# Patient Record
Sex: FEMALE | Race: WHITE | NOT HISPANIC OR LATINO | Employment: FULL TIME
[De-identification: names, ages, dates, MRNs, and addresses within clinical notes are randomized per-mention and may not be internally consistent; named-entity substitution may affect disease eponyms.]

---

## 2018-06-30 ENCOUNTER — HEALTH MAINTENANCE LETTER (OUTPATIENT)
Age: 28
End: 2018-06-30

## 2019-09-30 ENCOUNTER — HEALTH MAINTENANCE LETTER (OUTPATIENT)
Age: 29
End: 2019-09-30

## 2021-01-15 ENCOUNTER — HEALTH MAINTENANCE LETTER (OUTPATIENT)
Age: 31
End: 2021-01-15

## 2021-10-24 ENCOUNTER — HEALTH MAINTENANCE LETTER (OUTPATIENT)
Age: 31
End: 2021-10-24

## 2022-02-13 ENCOUNTER — HEALTH MAINTENANCE LETTER (OUTPATIENT)
Age: 32
End: 2022-02-13

## 2022-10-15 ENCOUNTER — HEALTH MAINTENANCE LETTER (OUTPATIENT)
Age: 32
End: 2022-10-15

## 2023-03-26 ENCOUNTER — HEALTH MAINTENANCE LETTER (OUTPATIENT)
Age: 33
End: 2023-03-26

## 2023-09-18 LAB
HEPATITIS B SURFACE ANTIGEN (EXTERNAL): NEGATIVE
HIV1+2 AB SERPL QL IA: NEGATIVE
RUBELLA ANTIBODY IGG (EXTERNAL): NORMAL
VDRL (SYPHILIS) (EXTERNAL): NONREACTIVE

## 2024-01-02 ENCOUNTER — HOSPITAL ENCOUNTER (OUTPATIENT)
Facility: CLINIC | Age: 34
Discharge: HOME | End: 2024-01-02
Attending: OBSTETRICS & GYNECOLOGY | Admitting: OBSTETRICS & GYNECOLOGY
Payer: COMMERCIAL

## 2024-01-02 ENCOUNTER — HOSPITAL ENCOUNTER (OUTPATIENT)
Facility: CLINIC | Age: 34
End: 2024-01-02
Admitting: OBSTETRICS & GYNECOLOGY

## 2024-01-02 VITALS
HEIGHT: 65 IN | TEMPERATURE: 98.2 F | SYSTOLIC BLOOD PRESSURE: 117 MMHG | BODY MASS INDEX: 29.66 KG/M2 | RESPIRATION RATE: 16 BRPM | WEIGHT: 178 LBS | HEART RATE: 103 BPM | DIASTOLIC BLOOD PRESSURE: 74 MMHG

## 2024-01-02 PROCEDURE — 250N000013 HC RX MED GY IP 250 OP 250 PS 637: Performed by: OBSTETRICS & GYNECOLOGY

## 2024-01-02 PROCEDURE — G0463 HOSPITAL OUTPT CLINIC VISIT: HCPCS

## 2024-01-02 RX ORDER — ACETAMINOPHEN 325 MG/1
650 TABLET ORAL EVERY 6 HOURS PRN
Status: ON HOLD | COMMUNITY
End: 2024-04-23

## 2024-01-02 RX ADMIN — CYCLOBENZAPRINE HYDROCHLORIDE 7.5 MG: 5 TABLET, FILM COATED ORAL at 11:05

## 2024-01-02 ASSESSMENT — ACTIVITIES OF DAILY LIVING (ADL): ADLS_ACUITY_SCORE: 22

## 2024-01-02 NOTE — DISCHARGE INSTRUCTIONS
Discharge Instruction for Undelivered Patients      You were seen for:  Back Pain  We Consulted: Dr. Green  You had (Test or Medicine):uterine and fetal monitoring, Flexeril     Diet:   Drink 8 to 12 glasses of liquids (milk, juice, water) every day.  You may eat meals and snacks.     Activity:  Call your doctor or nurse midwife if your baby is moving less than usual.   Rest and take it easy for the next couple days.    Call your provider if you notice:  Swelling in your face or increased swelling in your hands or legs.  Headaches that are not relieved by Tylenol (acetaminophen).  Changes in your vision (blurring: seeing spots or stars.)  Nausea (sick to your stomach) and vomiting (throwing up).   Weight gain of 5 pounds or more per week.  Heartburn that doesn't go away.  Signs of bladder infection: pain when you urinate (use the toilet), need to go more often and more urgently.  The bag of bains (rupture of membranes) breaks, or you notice leaking in your underwear.  Bright red blood in your underwear.  Abdominal (lower belly) or stomach pain.  *If less than 34 weeks: Contractions (tightening) more than 6 times in one hour.  Increase or change in vaginal discharge (note the color and amount)    Follow-up:  Make and appointment to be seen in the next 2-3 days with provider and possible PT referral

## 2024-01-02 NOTE — PROVIDER NOTIFICATION
01/02/24 1032   Provider Notification   Provider Name/Title    Method of Notification Phone   Request Evaluate - Remote   Notification Reason Patient Arrived;Pain;Other (Comment)     Dr. Green paged and updated on patient arrival, patient c/o left sided low back pain near her tailbone that has been happening for past several weeks and has gotten significantly worse over the past 2-3 days. Patient unable to ambulate independently and has had to crawl on hands and knees to move around. Patient reporting any pressure on left leg causes sharp pain. FHR tracing appropriate  for gestational age with baseline 145bpm, no contractions per toco or palpation. Orders received for a one time dose of flexeril, see how patient responds, if improves patient may discharge to home with follow up to be scheduled in clinic and for PT referral to be placed. If no improvement, will plan to update provider for further orders.

## 2024-01-02 NOTE — PLAN OF CARE
Data: Patient presented to [facility]: 2024  9:50 AM.  Reason for maternal/fetal assessment is low back pain. Patient reports she has been having left sided back pain near her tailbone for the last several weeks, but the pain has gotten significantly worse over the past 2-3 days. Patient states she has been unable to get out of bed independently and has required assistance to the bathroom or has had to crawl on hands and knees to get around. Patient reports any pressure on left leg causes sharp pain in near her tailbone only on the left side. Patient observed to be uncomfortable with movement and wincing with coughing. Patient has tried ice, heat, lidocaine patch and tylenol and has little to no relief  Patient is a .  Prenatal record reviewed. Pregnancy has been uncomplicated..  Gestational Age 24w3d. VSS. Fetal movement active. Patient denies uterine contractions, leaking of vaginal fluid/rupture of membranes, vaginal bleeding, abdominal pain, pelvic pressure, nausea, vomiting, headache, visual disturbances, epigastric or URQ pain, significant edema. Support person is present.   Action: Verbal consent for EFM. Triage assessment completed. Bill of rights reviewed.  Response: Patient verbalized agreement with plan. Will contact Dr Marisol Green with update and for further orders.

## 2024-01-02 NOTE — PLAN OF CARE
Data: Patient assessed in the [facility] for lower left sided back pain near her tailbone.  Cervical exam not examined.  Membranes intact.  Contractions/uterine assessment none per toco, palpation, or patient report. Back pain slightly improved after Flexeril administration, patient now able to get out of bed and walk short distance independently.  Action:  Presumed adequate fetal oxygenation documented (see flow record). Discharge instructions reviewed.  Patient instructed to report change in fetal movement, vaginal leaking of fluid or bleeding, abdominal pain, or any concerns related to the pregnancy to her nurse/physician.    Response: Orders to discharge home per Marisol Green.  Patient verbalized understanding of education and verbalized agreement with plan. Discharged to home at 1202.

## 2024-02-19 ENCOUNTER — HOSPITAL ENCOUNTER (OUTPATIENT)
Facility: CLINIC | Age: 34
Discharge: HOME | End: 2024-02-19
Attending: STUDENT IN AN ORGANIZED HEALTH CARE EDUCATION/TRAINING PROGRAM | Admitting: STUDENT IN AN ORGANIZED HEALTH CARE EDUCATION/TRAINING PROGRAM
Payer: COMMERCIAL

## 2024-02-19 VITALS — SYSTOLIC BLOOD PRESSURE: 120 MMHG | DIASTOLIC BLOOD PRESSURE: 75 MMHG

## 2024-02-19 PROBLEM — Z36.89 ENCOUNTER FOR TRIAGE IN PREGNANT PATIENT: Status: ACTIVE | Noted: 2024-02-19

## 2024-02-19 PROCEDURE — 59025 FETAL NON-STRESS TEST: CPT

## 2024-02-19 PROCEDURE — G0463 HOSPITAL OUTPT CLINIC VISIT: HCPCS | Mod: 25

## 2024-02-19 RX ORDER — LIDOCAINE 40 MG/G
CREAM TOPICAL
Status: DISCONTINUED | OUTPATIENT
Start: 2024-02-19 | End: 2024-02-19 | Stop reason: HOSPADM

## 2024-02-19 ASSESSMENT — ACTIVITIES OF DAILY LIVING (ADL): ADLS_ACUITY_SCORE: 35

## 2024-02-19 NOTE — PROVIDER NOTIFICATION
02/19/24 1040   Provider Notification   Provider Name/Title Dr. Funez   Method of Notification In Department     Dr Michelle Barrios informed of patient arrival and assessment including the following:    Reason for maternal/fetal assessment decreased fetal movement. Pt reports not feeling baby move as much in the last couple days and no movement today. Fetal status normal baseline, moderate variability, accelerations present and no decelerations. Plan per provider/orders received for discharge home after a reactive NST.

## 2024-02-19 NOTE — DISCHARGE INSTRUCTIONS
Learning About When to Call Your Doctor During Pregnancy (After 20 Weeks)  Overview  It's common to have concerns about what might be a problem when you're pregnant. Most pregnancies don't have any serious problems. But it's still important to know when to call your doctor if you have certain symptoms or signs of labor.  These are general suggestions. Your doctor may give you some more information about when to call.  When to call your doctor (after 20 weeks)  Call 911  anytime you think you may need emergency care. For example, call if:  You have severe vaginal bleeding. This means you are soaking through a pad each hour for 2 or more hours.  You have sudden, severe pain in your belly.  You have chest pain, are short of breath, or cough up blood.  You passed out (lost consciousness).  You have a seizure.  You see or feel the umbilical cord.  You think you are about to deliver your baby and can't make it safely to the hospital or birthing center.  Call your doctor now or seek immediate medical care if:  You have vaginal bleeding.  You have belly pain.  You have a fever.  You are dizzy or lightheaded, or you feel like you may faint.  You have signs of a blood clot in your leg (called a deep vein thrombosis), such as:  Pain in the calf, back of the knee, thigh, or groin.  Swelling in your leg or groin.  A color change on the leg or groin. The skin may be reddish or purplish, depending on your usual skin color.  You have symptoms of preeclampsia, such as:  Sudden swelling of your face, hands, or feet.  New vision problems (such as dimness, blurring, or seeing spots).  A severe headache.  You have a sudden release of fluid from your vagina. (You think your water broke.)  You've been having regular contractions for an hour. This means that you've had at least 6 contractions within 1 hour, even after you change your position and drink fluids.  You notice that your baby has stopped moving or is moving less than  "normal.  You have signs of heart failure, such as:  New or increased shortness of breath.  New or worse swelling in your legs, ankles, or feet.  Sudden weight gain, such as more than 2 to 3 pounds in a day or 5 pounds in a week.  Feeling so tired or weak that you cannot do your usual activities.  You have symptoms of a urinary tract infection. These may include:  Pain or burning when you urinate.  A frequent need to urinate without being able to pass much urine.  Pain in the flank, which is just below the rib cage and above the waist on either side of the back.  Blood in your urine.  Watch closely for changes in your health, and be sure to contact your doctor if:  You have vaginal discharge that smells bad.  You feel sad, anxious, or hopeless for more than a few days.  You have skin changes, such as a rash, itching, or a yellow color to your skin.  You have other concerns about your pregnancy.  If you have labor signs at 37 weeks or more  If you have signs of labor at 37 weeks or more, your doctor may tell you to call when your labor becomes more active. Symptoms of active labor include:  Contractions that are regular.  Contractions that are less than 5 minutes apart.  Contractions that are hard to talk through.  Follow-up care is a key part of your treatment and safety. Be sure to make and go to all appointments, and call your doctor if you are having problems. It's also a good idea to know your test results and keep a list of the medicines you take.  Where can you learn more?  Go to https://www.[employer].net/patiented  Enter N531 in the search box to learn more about \"Learning About When to Call Your Doctor During Pregnancy (After 20 Weeks).\"  Current as of: July 11, 2023               Content Version: 13.8    9409-7366 [employer].   Care instructions adapted under license by your healthcare professional. If you have questions about a medical condition or this instruction, always ask your healthcare " professional. [employer] disclaims any warranty or liability for your use of this information.

## 2024-02-19 NOTE — PROVIDER NOTIFICATION
02/19/24 1110   Provider Notification   Provider Name/Title Dr. Ospina   Method of Notification Phone     MD reviewed fetal tracing and occasional contractions. Patient reports lower abdominal tightening which also feels like baby moving. Okay to discharge home

## 2024-03-25 LAB — GROUP B STREPTOCOCCUS (EXTERNAL): NEGATIVE

## 2024-04-20 ENCOUNTER — MEDICAL CORRESPONDENCE (OUTPATIENT)
Dept: HEALTH INFORMATION MANAGEMENT | Facility: CLINIC | Age: 34
End: 2024-04-20
Payer: COMMERCIAL

## 2024-04-21 ENCOUNTER — HOSPITAL ENCOUNTER (INPATIENT)
Facility: CLINIC | Age: 34
LOS: 2 days | Discharge: HOME HEALTH | End: 2024-04-23
Attending: OBSTETRICS & GYNECOLOGY | Admitting: OBSTETRICS & GYNECOLOGY
Payer: COMMERCIAL

## 2024-04-21 ENCOUNTER — ANESTHESIA EVENT (OUTPATIENT)
Dept: OBGYN | Facility: CLINIC | Age: 34
End: 2024-04-21

## 2024-04-21 ENCOUNTER — ANESTHESIA (OUTPATIENT)
Dept: OBGYN | Facility: CLINIC | Age: 34
End: 2024-04-21
Payer: COMMERCIAL

## 2024-04-21 LAB
ABO/RH(D): NORMAL
ALBUMIN SERPL BCG-MCNC: 3.3 G/DL (ref 3.5–5.2)
ALP SERPL-CCNC: 209 U/L (ref 40–150)
ALT SERPL W P-5'-P-CCNC: 18 U/L (ref 0–50)
ANION GAP SERPL CALCULATED.3IONS-SCNC: 9 MMOL/L (ref 7–15)
ANTIBODY SCREEN: NEGATIVE
AST SERPL W P-5'-P-CCNC: 19 U/L (ref 0–45)
BILIRUB SERPL-MCNC: 0.2 MG/DL
BUN SERPL-MCNC: 7.6 MG/DL (ref 6–20)
CALCIUM SERPL-MCNC: 9 MG/DL (ref 8.6–10)
CHLORIDE SERPL-SCNC: 104 MMOL/L (ref 98–107)
CREAT SERPL-MCNC: 0.64 MG/DL (ref 0.51–0.95)
CRYSTALS AMN MICRO: NORMAL
DEPRECATED HCO3 PLAS-SCNC: 25 MMOL/L (ref 22–29)
EGFRCR SERPLBLD CKD-EPI 2021: >90 ML/MIN/1.73M2
ERYTHROCYTE [DISTWIDTH] IN BLOOD BY AUTOMATED COUNT: 13.2 % (ref 10–15)
GLUCOSE SERPL-MCNC: 115 MG/DL (ref 70–99)
HCT VFR BLD AUTO: 37.3 % (ref 35–47)
HGB BLD-MCNC: 12 G/DL (ref 11.7–15.7)
HGB BLD-MCNC: 12.1 G/DL (ref 11.7–15.7)
MCH RBC QN AUTO: 28.4 PG (ref 26.5–33)
MCHC RBC AUTO-ENTMCNC: 32.4 G/DL (ref 31.5–36.5)
MCV RBC AUTO: 88 FL (ref 78–100)
PLATELET # BLD AUTO: 186 10E3/UL (ref 150–450)
POTASSIUM SERPL-SCNC: 3.8 MMOL/L (ref 3.4–5.3)
PROT SERPL-MCNC: 6 G/DL (ref 6.4–8.3)
RBC # BLD AUTO: 4.26 10E6/UL (ref 3.8–5.2)
RUPTURE OF FETAL MEMBRANES BY ROM PLUS: POSITIVE
SODIUM SERPL-SCNC: 138 MMOL/L (ref 135–145)
SPECIMEN EXPIRATION DATE: NORMAL
T PALLIDUM AB SER QL: NONREACTIVE
WBC # BLD AUTO: 8.5 10E3/UL (ref 4–11)

## 2024-04-21 PROCEDURE — 250N000011 HC RX IP 250 OP 636: Performed by: ANESTHESIOLOGY

## 2024-04-21 PROCEDURE — 84112 EVAL AMNIOTIC FLUID PROTEIN: CPT | Performed by: OBSTETRICS & GYNECOLOGY

## 2024-04-21 PROCEDURE — 258N000003 HC RX IP 258 OP 636: Performed by: OBSTETRICS & GYNECOLOGY

## 2024-04-21 PROCEDURE — 250N000009 HC RX 250: Performed by: OBSTETRICS & GYNECOLOGY

## 2024-04-21 PROCEDURE — 370N000003 HC ANESTHESIA WARD SERVICE: Performed by: ANESTHESIOLOGY

## 2024-04-21 PROCEDURE — 3E0R3BZ INTRODUCTION OF ANESTHETIC AGENT INTO SPINAL CANAL, PERCUTANEOUS APPROACH: ICD-10-PCS | Performed by: ANESTHESIOLOGY

## 2024-04-21 PROCEDURE — 80053 COMPREHEN METABOLIC PANEL: CPT | Performed by: OBSTETRICS & GYNECOLOGY

## 2024-04-21 PROCEDURE — 86780 TREPONEMA PALLIDUM: CPT | Performed by: OBSTETRICS & GYNECOLOGY

## 2024-04-21 PROCEDURE — 120N000001 HC R&B MED SURG/OB

## 2024-04-21 PROCEDURE — 36415 COLL VENOUS BLD VENIPUNCTURE: CPT | Performed by: OBSTETRICS & GYNECOLOGY

## 2024-04-21 PROCEDURE — 00HU33Z INSERTION OF INFUSION DEVICE INTO SPINAL CANAL, PERCUTANEOUS APPROACH: ICD-10-PCS | Performed by: ANESTHESIOLOGY

## 2024-04-21 PROCEDURE — 0KQM0ZZ REPAIR PERINEUM MUSCLE, OPEN APPROACH: ICD-10-PCS | Performed by: OBSTETRICS & GYNECOLOGY

## 2024-04-21 PROCEDURE — 85018 HEMOGLOBIN: CPT | Performed by: OBSTETRICS & GYNECOLOGY

## 2024-04-21 PROCEDURE — 86900 BLOOD TYPING SEROLOGIC ABO: CPT | Performed by: OBSTETRICS & GYNECOLOGY

## 2024-04-21 PROCEDURE — 250N000013 HC RX MED GY IP 250 OP 250 PS 637: Performed by: OBSTETRICS & GYNECOLOGY

## 2024-04-21 PROCEDURE — 722N000001 HC LABOR CARE VAGINAL DELIVERY SINGLE

## 2024-04-21 PROCEDURE — 85018 HEMOGLOBIN: CPT | Performed by: ANESTHESIOLOGY

## 2024-04-21 PROCEDURE — G0463 HOSPITAL OUTPT CLINIC VISIT: HCPCS

## 2024-04-21 PROCEDURE — 250N000011 HC RX IP 250 OP 636: Mod: JZ | Performed by: OBSTETRICS & GYNECOLOGY

## 2024-04-21 PROCEDURE — 0UQKXZZ REPAIR HYMEN, EXTERNAL APPROACH: ICD-10-PCS | Performed by: OBSTETRICS & GYNECOLOGY

## 2024-04-21 RX ORDER — KETOROLAC TROMETHAMINE 30 MG/ML
30 INJECTION, SOLUTION INTRAMUSCULAR; INTRAVENOUS
Status: DISCONTINUED | OUTPATIENT
Start: 2024-04-21 | End: 2024-04-23

## 2024-04-21 RX ORDER — LOPERAMIDE HCL 2 MG
2 CAPSULE ORAL
Status: DISCONTINUED | OUTPATIENT
Start: 2024-04-21 | End: 2024-04-23 | Stop reason: HOSPADM

## 2024-04-21 RX ORDER — FENTANYL CITRATE 50 UG/ML
100 INJECTION, SOLUTION INTRAMUSCULAR; INTRAVENOUS
Status: DISCONTINUED | OUTPATIENT
Start: 2024-04-21 | End: 2024-04-21 | Stop reason: HOSPADM

## 2024-04-21 RX ORDER — TERBUTALINE SULFATE 1 MG/ML
0.25 INJECTION, SOLUTION SUBCUTANEOUS
Status: DISCONTINUED | OUTPATIENT
Start: 2024-04-21 | End: 2024-04-21 | Stop reason: HOSPADM

## 2024-04-21 RX ORDER — MODIFIED LANOLIN
OINTMENT (GRAM) TOPICAL
Status: DISCONTINUED | OUTPATIENT
Start: 2024-04-21 | End: 2024-04-23 | Stop reason: HOSPADM

## 2024-04-21 RX ORDER — NALBUPHINE HYDROCHLORIDE 20 MG/ML
2.5-5 INJECTION, SOLUTION INTRAMUSCULAR; INTRAVENOUS; SUBCUTANEOUS EVERY 6 HOURS PRN
Status: DISCONTINUED | OUTPATIENT
Start: 2024-04-21 | End: 2024-04-23 | Stop reason: HOSPADM

## 2024-04-21 RX ORDER — MISOPROSTOL 200 UG/1
400 TABLET ORAL
Status: DISCONTINUED | OUTPATIENT
Start: 2024-04-21 | End: 2024-04-21 | Stop reason: HOSPADM

## 2024-04-21 RX ORDER — OXYTOCIN/0.9 % SODIUM CHLORIDE 30/500 ML
340 PLASTIC BAG, INJECTION (ML) INTRAVENOUS CONTINUOUS PRN
Status: DISCONTINUED | OUTPATIENT
Start: 2024-04-21 | End: 2024-04-23 | Stop reason: HOSPADM

## 2024-04-21 RX ORDER — NALOXONE HYDROCHLORIDE 0.4 MG/ML
0.2 INJECTION, SOLUTION INTRAMUSCULAR; INTRAVENOUS; SUBCUTANEOUS
Status: DISCONTINUED | OUTPATIENT
Start: 2024-04-21 | End: 2024-04-21 | Stop reason: HOSPADM

## 2024-04-21 RX ORDER — DOCUSATE SODIUM 100 MG/1
100 CAPSULE, LIQUID FILLED ORAL DAILY
Status: DISCONTINUED | OUTPATIENT
Start: 2024-04-21 | End: 2024-04-23 | Stop reason: HOSPADM

## 2024-04-21 RX ORDER — ACETAMINOPHEN 325 MG/1
650 TABLET ORAL EVERY 4 HOURS PRN
Status: DISCONTINUED | OUTPATIENT
Start: 2024-04-21 | End: 2024-04-21 | Stop reason: HOSPADM

## 2024-04-21 RX ORDER — METHYLERGONOVINE MALEATE 0.2 MG/ML
200 INJECTION INTRAVENOUS
Status: DISCONTINUED | OUTPATIENT
Start: 2024-04-21 | End: 2024-04-23 | Stop reason: HOSPADM

## 2024-04-21 RX ORDER — HYDROXYZINE HYDROCHLORIDE 50 MG/1
50 TABLET, FILM COATED ORAL EVERY 6 HOURS PRN
Status: DISCONTINUED | OUTPATIENT
Start: 2024-04-21 | End: 2024-04-23 | Stop reason: HOSPADM

## 2024-04-21 RX ORDER — OXYTOCIN/0.9 % SODIUM CHLORIDE 30/500 ML
100-340 PLASTIC BAG, INJECTION (ML) INTRAVENOUS CONTINUOUS PRN
Status: DISCONTINUED | OUTPATIENT
Start: 2024-04-21 | End: 2024-04-23 | Stop reason: HOSPADM

## 2024-04-21 RX ORDER — MISOPROSTOL 200 UG/1
800 TABLET ORAL
Status: DISCONTINUED | OUTPATIENT
Start: 2024-04-21 | End: 2024-04-21 | Stop reason: HOSPADM

## 2024-04-21 RX ORDER — SODIUM CHLORIDE, SODIUM LACTATE, POTASSIUM CHLORIDE, CALCIUM CHLORIDE 600; 310; 30; 20 MG/100ML; MG/100ML; MG/100ML; MG/100ML
10-125 INJECTION, SOLUTION INTRAVENOUS CONTINUOUS
Status: DISCONTINUED | OUTPATIENT
Start: 2024-04-21 | End: 2024-04-23 | Stop reason: HOSPADM

## 2024-04-21 RX ORDER — HYDROXYZINE HYDROCHLORIDE 25 MG/1
25 TABLET, FILM COATED ORAL EVERY 6 HOURS PRN
Status: DISCONTINUED | OUTPATIENT
Start: 2024-04-21 | End: 2024-04-23 | Stop reason: HOSPADM

## 2024-04-21 RX ORDER — LIDOCAINE 40 MG/G
CREAM TOPICAL
Status: DISCONTINUED | OUTPATIENT
Start: 2024-04-21 | End: 2024-04-23 | Stop reason: HOSPADM

## 2024-04-21 RX ORDER — NALOXONE HYDROCHLORIDE 0.4 MG/ML
0.4 INJECTION, SOLUTION INTRAMUSCULAR; INTRAVENOUS; SUBCUTANEOUS
Status: DISCONTINUED | OUTPATIENT
Start: 2024-04-21 | End: 2024-04-23 | Stop reason: HOSPADM

## 2024-04-21 RX ORDER — MISOPROSTOL 200 UG/1
400 TABLET ORAL
Status: DISCONTINUED | OUTPATIENT
Start: 2024-04-21 | End: 2024-04-23 | Stop reason: HOSPADM

## 2024-04-21 RX ORDER — OXYTOCIN 10 [USP'U]/ML
10 INJECTION, SOLUTION INTRAMUSCULAR; INTRAVENOUS
Status: DISCONTINUED | OUTPATIENT
Start: 2024-04-21 | End: 2024-04-21 | Stop reason: HOSPADM

## 2024-04-21 RX ORDER — NALOXONE HYDROCHLORIDE 0.4 MG/ML
0.4 INJECTION, SOLUTION INTRAMUSCULAR; INTRAVENOUS; SUBCUTANEOUS
Status: DISCONTINUED | OUTPATIENT
Start: 2024-04-21 | End: 2024-04-21 | Stop reason: HOSPADM

## 2024-04-21 RX ORDER — ACETAMINOPHEN 325 MG/1
650 TABLET ORAL EVERY 4 HOURS PRN
Status: DISCONTINUED | OUTPATIENT
Start: 2024-04-21 | End: 2024-04-23 | Stop reason: HOSPADM

## 2024-04-21 RX ORDER — PROCHLORPERAZINE 25 MG
25 SUPPOSITORY, RECTAL RECTAL EVERY 12 HOURS PRN
Status: DISCONTINUED | OUTPATIENT
Start: 2024-04-21 | End: 2024-04-21 | Stop reason: HOSPADM

## 2024-04-21 RX ORDER — NALOXONE HYDROCHLORIDE 0.4 MG/ML
0.2 INJECTION, SOLUTION INTRAMUSCULAR; INTRAVENOUS; SUBCUTANEOUS
Status: DISCONTINUED | OUTPATIENT
Start: 2024-04-21 | End: 2024-04-23 | Stop reason: HOSPADM

## 2024-04-21 RX ORDER — CARBOPROST TROMETHAMINE 250 UG/ML
250 INJECTION, SOLUTION INTRAMUSCULAR
Status: DISCONTINUED | OUTPATIENT
Start: 2024-04-21 | End: 2024-04-23 | Stop reason: HOSPADM

## 2024-04-21 RX ORDER — HYDROCORTISONE 25 MG/G
CREAM TOPICAL 3 TIMES DAILY PRN
Status: DISCONTINUED | OUTPATIENT
Start: 2024-04-21 | End: 2024-04-23 | Stop reason: HOSPADM

## 2024-04-21 RX ORDER — IBUPROFEN 800 MG/1
800 TABLET, FILM COATED ORAL
Status: DISCONTINUED | OUTPATIENT
Start: 2024-04-21 | End: 2024-04-23

## 2024-04-21 RX ORDER — OXYCODONE HYDROCHLORIDE 5 MG/1
5 TABLET ORAL EVERY 4 HOURS PRN
Status: DISCONTINUED | OUTPATIENT
Start: 2024-04-21 | End: 2024-04-23 | Stop reason: HOSPADM

## 2024-04-21 RX ORDER — PROCHLORPERAZINE MALEATE 10 MG
10 TABLET ORAL EVERY 6 HOURS PRN
Status: DISCONTINUED | OUTPATIENT
Start: 2024-04-21 | End: 2024-04-21 | Stop reason: HOSPADM

## 2024-04-21 RX ORDER — LOPERAMIDE HCL 2 MG
2 CAPSULE ORAL
Status: DISCONTINUED | OUTPATIENT
Start: 2024-04-21 | End: 2024-04-21 | Stop reason: HOSPADM

## 2024-04-21 RX ORDER — SODIUM CHLORIDE, SODIUM LACTATE, POTASSIUM CHLORIDE, CALCIUM CHLORIDE 600; 310; 30; 20 MG/100ML; MG/100ML; MG/100ML; MG/100ML
INJECTION, SOLUTION INTRAVENOUS CONTINUOUS
Status: DISCONTINUED | OUTPATIENT
Start: 2024-04-21 | End: 2024-04-21 | Stop reason: HOSPADM

## 2024-04-21 RX ORDER — METHYLERGONOVINE MALEATE 0.2 MG/ML
200 INJECTION INTRAVENOUS
Status: DISCONTINUED | OUTPATIENT
Start: 2024-04-21 | End: 2024-04-21 | Stop reason: HOSPADM

## 2024-04-21 RX ORDER — OXYTOCIN/0.9 % SODIUM CHLORIDE 30/500 ML
340 PLASTIC BAG, INJECTION (ML) INTRAVENOUS CONTINUOUS PRN
Status: DISCONTINUED | OUTPATIENT
Start: 2024-04-21 | End: 2024-04-21 | Stop reason: HOSPADM

## 2024-04-21 RX ORDER — CARBOPROST TROMETHAMINE 250 UG/ML
250 INJECTION, SOLUTION INTRAMUSCULAR
Status: DISCONTINUED | OUTPATIENT
Start: 2024-04-21 | End: 2024-04-21 | Stop reason: HOSPADM

## 2024-04-21 RX ORDER — BISACODYL 10 MG
10 SUPPOSITORY, RECTAL RECTAL DAILY PRN
Status: DISCONTINUED | OUTPATIENT
Start: 2024-04-21 | End: 2024-04-23 | Stop reason: HOSPADM

## 2024-04-21 RX ORDER — OXYTOCIN 10 [USP'U]/ML
10 INJECTION, SOLUTION INTRAMUSCULAR; INTRAVENOUS
Status: DISCONTINUED | OUTPATIENT
Start: 2024-04-21 | End: 2024-04-23 | Stop reason: HOSPADM

## 2024-04-21 RX ORDER — LOPERAMIDE HCL 2 MG
4 CAPSULE ORAL
Status: DISCONTINUED | OUTPATIENT
Start: 2024-04-21 | End: 2024-04-21 | Stop reason: HOSPADM

## 2024-04-21 RX ORDER — METOCLOPRAMIDE 10 MG/1
10 TABLET ORAL EVERY 6 HOURS PRN
Status: DISCONTINUED | OUTPATIENT
Start: 2024-04-21 | End: 2024-04-21 | Stop reason: HOSPADM

## 2024-04-21 RX ORDER — TRANEXAMIC ACID 10 MG/ML
1 INJECTION, SOLUTION INTRAVENOUS EVERY 30 MIN PRN
Status: DISCONTINUED | OUTPATIENT
Start: 2024-04-21 | End: 2024-04-21 | Stop reason: HOSPADM

## 2024-04-21 RX ORDER — SODIUM CHLORIDE, SODIUM LACTATE, POTASSIUM CHLORIDE, CALCIUM CHLORIDE 600; 310; 30; 20 MG/100ML; MG/100ML; MG/100ML; MG/100ML
INJECTION, SOLUTION INTRAVENOUS CONTINUOUS PRN
Status: DISCONTINUED | OUTPATIENT
Start: 2024-04-21 | End: 2024-04-21 | Stop reason: HOSPADM

## 2024-04-21 RX ORDER — OXYTOCIN/0.9 % SODIUM CHLORIDE 30/500 ML
1-24 PLASTIC BAG, INJECTION (ML) INTRAVENOUS CONTINUOUS
Status: DISCONTINUED | OUTPATIENT
Start: 2024-04-21 | End: 2024-04-21 | Stop reason: HOSPADM

## 2024-04-21 RX ORDER — FENTANYL CITRATE-0.9 % NACL/PF 10 MCG/ML
100 PLASTIC BAG, INJECTION (ML) INTRAVENOUS EVERY 5 MIN PRN
Status: DISCONTINUED | OUTPATIENT
Start: 2024-04-21 | End: 2024-04-21 | Stop reason: HOSPADM

## 2024-04-21 RX ORDER — ONDANSETRON 2 MG/ML
4 INJECTION INTRAMUSCULAR; INTRAVENOUS EVERY 6 HOURS PRN
Status: DISCONTINUED | OUTPATIENT
Start: 2024-04-21 | End: 2024-04-21 | Stop reason: HOSPADM

## 2024-04-21 RX ORDER — HYDRALAZINE HYDROCHLORIDE 20 MG/ML
10 INJECTION INTRAMUSCULAR; INTRAVENOUS
Status: DISCONTINUED | OUTPATIENT
Start: 2024-04-21 | End: 2024-04-23 | Stop reason: HOSPADM

## 2024-04-21 RX ORDER — MISOPROSTOL 200 UG/1
800 TABLET ORAL
Status: DISCONTINUED | OUTPATIENT
Start: 2024-04-21 | End: 2024-04-23 | Stop reason: HOSPADM

## 2024-04-21 RX ORDER — LABETALOL HYDROCHLORIDE 5 MG/ML
20-80 INJECTION, SOLUTION INTRAVENOUS EVERY 10 MIN PRN
Status: DISCONTINUED | OUTPATIENT
Start: 2024-04-21 | End: 2024-04-23 | Stop reason: HOSPADM

## 2024-04-21 RX ORDER — CITRIC ACID/SODIUM CITRATE 334-500MG
30 SOLUTION, ORAL ORAL
Status: DISCONTINUED | OUTPATIENT
Start: 2024-04-21 | End: 2024-04-21 | Stop reason: HOSPADM

## 2024-04-21 RX ORDER — LOPERAMIDE HCL 2 MG
4 CAPSULE ORAL
Status: DISCONTINUED | OUTPATIENT
Start: 2024-04-21 | End: 2024-04-23 | Stop reason: HOSPADM

## 2024-04-21 RX ORDER — ONDANSETRON 4 MG/1
4 TABLET, ORALLY DISINTEGRATING ORAL EVERY 6 HOURS PRN
Status: DISCONTINUED | OUTPATIENT
Start: 2024-04-21 | End: 2024-04-21 | Stop reason: HOSPADM

## 2024-04-21 RX ORDER — IBUPROFEN 800 MG/1
800 TABLET, FILM COATED ORAL EVERY 6 HOURS PRN
Status: DISCONTINUED | OUTPATIENT
Start: 2024-04-21 | End: 2024-04-23 | Stop reason: HOSPADM

## 2024-04-21 RX ORDER — METOCLOPRAMIDE HYDROCHLORIDE 5 MG/ML
10 INJECTION INTRAMUSCULAR; INTRAVENOUS EVERY 6 HOURS PRN
Status: DISCONTINUED | OUTPATIENT
Start: 2024-04-21 | End: 2024-04-21 | Stop reason: HOSPADM

## 2024-04-21 RX ORDER — TRANEXAMIC ACID 10 MG/ML
1 INJECTION, SOLUTION INTRAVENOUS EVERY 30 MIN PRN
Status: DISCONTINUED | OUTPATIENT
Start: 2024-04-21 | End: 2024-04-23 | Stop reason: HOSPADM

## 2024-04-21 RX ADMIN — Medication: at 15:11

## 2024-04-21 RX ADMIN — LIDOCAINE HYDROCHLORIDE 20 ML: 10 INJECTION, SOLUTION EPIDURAL; INFILTRATION; INTRACAUDAL; PERINEURAL at 19:10

## 2024-04-21 RX ADMIN — HYDROXYZINE HYDROCHLORIDE 50 MG: 50 TABLET, FILM COATED ORAL at 16:14

## 2024-04-21 RX ADMIN — Medication 2 MILLI-UNITS/MIN: at 11:49

## 2024-04-21 RX ADMIN — TRANEXAMIC ACID 1 G: 10 INJECTION, SOLUTION INTRAVENOUS at 19:27

## 2024-04-21 RX ADMIN — SODIUM CHLORIDE, POTASSIUM CHLORIDE, SODIUM LACTATE AND CALCIUM CHLORIDE: 600; 310; 30; 20 INJECTION, SOLUTION INTRAVENOUS at 16:17

## 2024-04-21 RX ADMIN — SODIUM CHLORIDE, POTASSIUM CHLORIDE, SODIUM LACTATE AND CALCIUM CHLORIDE: 600; 310; 30; 20 INJECTION, SOLUTION INTRAVENOUS at 11:48

## 2024-04-21 RX ADMIN — METHYLERGONOVINE MALEATE 200 MCG: 0.2 INJECTION, SOLUTION INTRAMUSCULAR; INTRAVENOUS at 19:10

## 2024-04-21 RX ADMIN — ACETAMINOPHEN 650 MG: 325 TABLET, FILM COATED ORAL at 21:56

## 2024-04-21 RX ADMIN — IBUPROFEN 800 MG: 800 TABLET, FILM COATED ORAL at 21:56

## 2024-04-21 RX ADMIN — BUPIVACAINE HYDROCHLORIDE 10 ML: 2.5 INJECTION, SOLUTION EPIDURAL; INFILTRATION; INTRACAUDAL at 14:57

## 2024-04-21 RX ADMIN — Medication 340 ML/HR: at 19:04

## 2024-04-21 ASSESSMENT — ACTIVITIES OF DAILY LIVING (ADL)
ADLS_ACUITY_SCORE: 18
ADLS_ACUITY_SCORE: 20
ADLS_ACUITY_SCORE: 18
ADLS_ACUITY_SCORE: 18
ADLS_ACUITY_SCORE: 35
ADLS_ACUITY_SCORE: 18
ADLS_ACUITY_SCORE: 20
ADLS_ACUITY_SCORE: 18

## 2024-04-21 NOTE — PROGRESS NOTES
Pt pushing with good effort. States comfortable with epidural in place. +FM  SVE 10/100/+2 with pushes  [place] ctxs q 2-3 min   bpm, with variable decels with contractions, good recovery,   Anticipate   Dr. Rancho Wheeler  [phone]

## 2024-04-21 NOTE — PROVIDER NOTIFICATION
04/21/24 1055   Provider Notification   Provider Name/Title Dr. Vickers   Method of Notification At Bedside     MD at bedside to discuss with patient. Pt reports contractions feel the same as when she arrived, not noticing pain. Pt reports only feeling tightening. No need for SVE at this time. MD discussed starting pitocin since ruptured at 0540. Pt open to pitocin augmentation. Planning for epidural in active labor. Birth plan reviewed with patient and provider.     Order for 2x2 pitocin.

## 2024-04-21 NOTE — LETTER
04/23/24      To Whom it may concern:    Xavier Bello was at [facility] today, having become a father on 04/22/24  Please excuse him from work, and begin his family leave.    Sincerely,      Sissy Hobbs MD on 4/23/2024 at 10:37 AM

## 2024-04-21 NOTE — PROVIDER NOTIFICATION
"   04/21/24 0934   Provider Notification   Provider Name/Title Dr. Vickers   Method of Notification Phone     MD notified of pt arrival. SROM at 0540, small amount of clear fluid seen on pad. Pt reports it was \"yellow\" at home, will continue to monitor color. SVE at 0830 1/60/0. Contractions every 5-6 min that palpate moderate, pt reports not feeling. FHR category 1.     Pt would like to ambulate to see if contractions become stronger. RN to recheck cervix ~1100, pt ok to be off monitor to ambulate.     Order for intrapartum admission orders and labs.   "

## 2024-04-21 NOTE — PROVIDER NOTIFICATION
"MD updated via nanoMR that patient had elevated BP's after epidural.   161/92 @1507  141/84 @1520  141/85 @1533  123/76 @1548    Dr. Vickers would like to order Pre-E labs: CMP, CBC, & Protein Creatine Ratio.    RN also informed MD that patient is experiencing a lot of anxiety, epidural placement was very fear inducing for her. She states that she can \"feel the anxiety in my chest.\" RN suggests etiology of elevated BP's may be anxiety and is requesting PRN atarax.    MD okay to Order 50mg Atarax PRN    "

## 2024-04-21 NOTE — PROGRESS NOTES
Pt comfortable feeling mild pain with contractions. Currently at 4 mU/hr of pitocin, tolerating and coping well. +FM. FHT Cat I.   SVE     Continue prolonged monitoring  Continue pitocin per IOL protocol  Anticipate     Dr. Rancho Wheeler  [phone]
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
## 2024-04-21 NOTE — ANESTHESIA PREPROCEDURE EVALUATION
"Anesthesia Pre-Procedure Evaluation    Patient: Nela Bello   MRN: [id number] : 1990        Procedure :           Past Medical History:   Diagnosis Date    Anxiety     Depressive disorder       History reviewed. No pertinent surgical history.   Allergies   Allergen Reactions    Penicillins Hives    Latex Rash      Social History     Tobacco Use    Smoking status: Never    Smokeless tobacco: Not on file   Substance Use Topics    Alcohol use: Not Currently      Wt Readings from Last 1 Encounters:   24 86.2 kg (190 lb)        Anesthesia Evaluation        No history of anesthetic complications       ROS/MED HX  ENT/Pulmonary:  - neg pulmonary ROS     Neurologic:       Cardiovascular:  - neg cardiovascular ROS     METS/Exercise Tolerance:     Hematologic:       Musculoskeletal:       GI/Hepatic:       Renal/Genitourinary:       Endo:     (+)               Obesity (BMI 31),       Psychiatric/Substance Use:     (+) psychiatric history anxiety and depression alcohol abuse      Infectious Disease:       Malignancy:       Other:            Physical Exam    Airway        Mallampati: II   TM distance: > 3 FB   Neck ROM: full   Mouth opening: > 3 cm    Respiratory Devices and Support         Dental           Cardiovascular   cardiovascular exam normal          Pulmonary   pulmonary exam normal                OUTSIDE LABS:  CBC:   Lab Results   Component Value Date    HGB 12.0 2024     BMP: No results found for: \"NA\", \"POTASSIUM\", \"CHLORIDE\", \"CO2\", \"BUN\", \"CR\", \"GLC\"  COAGS: No results found for: \"PTT\", \"INR\", \"FIBR\"  POC:   Lab Results   Component Value Date    HCG Negative 2013     HEPATIC: No results found for: \"ALBUMIN\", \"PROTTOTAL\", \"ALT\", \"AST\", \"GGT\", \"ALKPHOS\", \"BILITOTAL\", \"BILIDIRECT\", \"SANDRO\"  OTHER: No results found for: \"PH\", \"LACT\", \"A1C\", \"PAUL\", \"PHOS\", \"MAG\", \"LIPASE\", \"AMYLASE\", \"TSH\", \"T4\", \"T3\", \"CRP\", \"SED\"    Anesthesia Plan    ASA Status:  2       Anesthesia Type: Epidural. "              Consents    Anesthesia Plan(s) and associated risks, benefits, and realistic alternatives discussed. Questions answered and patient/representative(s) expressed understanding.     - Discussed:     - Discussed with:  Patient            Postoperative Care            Comments:               Maira Carlos MD    I have reviewed the pertinent notes and labs in the chart from the past 30 days and (re)examined the patient.  Any updates or changes from those notes are reflected in this note.

## 2024-04-21 NOTE — PLAN OF CARE
Data: Patient presented to [facility]: 2024  7:49 AM.  Reason for maternal/fetal assessment is leaking vaginal fluid. Patient reports she woke up at 0540 and her underwear felt wet, when she wiped there was pink tinged mucous. Pt has pad on now with small amount of mucous on it. Patient is a .  Prenatal record reviewed. Pregnancy has been uncomplicated..  Gestational Age 40w1d. VSS. Fetal movement active. Patient denies uterine contractions, vaginal bleeding, abdominal pain, pelvic pressure, nausea, vomiting, headache, visual disturbances, epigastric or URQ pain. Support person is present.   Action: Verbal consent for EFM. Triage assessment completed. Bill of rights reviewed.  Response: Patient verbalized agreement with plan. Will contact Dr Izabella Ford with update and for further orders.

## 2024-04-22 LAB
ALBUMIN SERPL BCG-MCNC: 3.3 G/DL (ref 3.5–5.2)
ALP SERPL-CCNC: 178 U/L (ref 40–150)
ALT SERPL W P-5'-P-CCNC: 18 U/L (ref 0–50)
ANION GAP SERPL CALCULATED.3IONS-SCNC: 14 MMOL/L (ref 7–15)
AST SERPL W P-5'-P-CCNC: 28 U/L (ref 0–45)
BILIRUB SERPL-MCNC: 0.4 MG/DL
BUN SERPL-MCNC: 5.5 MG/DL (ref 6–20)
CALCIUM SERPL-MCNC: 8.8 MG/DL (ref 8.6–10)
CHLORIDE SERPL-SCNC: 105 MMOL/L (ref 98–107)
CREAT SERPL-MCNC: 0.68 MG/DL (ref 0.51–0.95)
DEPRECATED HCO3 PLAS-SCNC: 19 MMOL/L (ref 22–29)
EGFRCR SERPLBLD CKD-EPI 2021: >90 ML/MIN/1.73M2
GLUCOSE SERPL-MCNC: 154 MG/DL (ref 70–99)
HGB BLD-MCNC: 10.8 G/DL (ref 11.7–15.7)
PLATELET # BLD AUTO: 161 10E3/UL (ref 150–450)
POTASSIUM SERPL-SCNC: 3.5 MMOL/L (ref 3.4–5.3)
PROT SERPL-MCNC: 5.6 G/DL (ref 6.4–8.3)
SODIUM SERPL-SCNC: 138 MMOL/L (ref 135–145)

## 2024-04-22 PROCEDURE — 36415 COLL VENOUS BLD VENIPUNCTURE: CPT | Performed by: OBSTETRICS & GYNECOLOGY

## 2024-04-22 PROCEDURE — 84450 TRANSFERASE (AST) (SGOT): CPT | Performed by: OBSTETRICS & GYNECOLOGY

## 2024-04-22 PROCEDURE — 120N000001 HC R&B MED SURG/OB

## 2024-04-22 PROCEDURE — 85018 HEMOGLOBIN: CPT | Performed by: OBSTETRICS & GYNECOLOGY

## 2024-04-22 PROCEDURE — 999N000080 HC STATISTIC IP LACTATION SERVICES 16-30 MIN

## 2024-04-22 PROCEDURE — 250N000013 HC RX MED GY IP 250 OP 250 PS 637: Performed by: OBSTETRICS & GYNECOLOGY

## 2024-04-22 PROCEDURE — 85049 AUTOMATED PLATELET COUNT: CPT | Performed by: OBSTETRICS & GYNECOLOGY

## 2024-04-22 RX ADMIN — IBUPROFEN 800 MG: 800 TABLET, FILM COATED ORAL at 13:31

## 2024-04-22 RX ADMIN — IBUPROFEN 800 MG: 800 TABLET, FILM COATED ORAL at 03:54

## 2024-04-22 RX ADMIN — ACETAMINOPHEN 650 MG: 325 TABLET, FILM COATED ORAL at 03:00

## 2024-04-22 RX ADMIN — DOCUSATE SODIUM 100 MG: 100 CAPSULE, LIQUID FILLED ORAL at 09:07

## 2024-04-22 RX ADMIN — ACETAMINOPHEN 650 MG: 325 TABLET, FILM COATED ORAL at 20:13

## 2024-04-22 RX ADMIN — BENZOCAINE: 11.4 AEROSOL, SPRAY TOPICAL at 03:16

## 2024-04-22 RX ADMIN — IBUPROFEN 800 MG: 800 TABLET, FILM COATED ORAL at 19:49

## 2024-04-22 RX ADMIN — ACETAMINOPHEN 650 MG: 325 TABLET, FILM COATED ORAL at 07:20

## 2024-04-22 RX ADMIN — ACETAMINOPHEN 650 MG: 325 TABLET, FILM COATED ORAL at 13:30

## 2024-04-22 ASSESSMENT — ACTIVITIES OF DAILY LIVING (ADL)
ADLS_ACUITY_SCORE: 18
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
## 2024-04-22 NOTE — PLAN OF CARE
"Goal Outcome Evaluation:      Plan of Care Reviewed With: patient, spouse    Overall Patient Progress: improvingOverall Patient Progress: improving    Outcome Evaluation: Patient adequate for discharge.      Problem: Adult Inpatient Plan of Care  Goal: Plan of Care Review  Description: The Plan of Care Review/Shift note should be completed every shift.  The Outcome Evaluation is a brief statement about your assessment that the patient is improving, declining, or no change.  This information will be displayed automatically on your shift  note.  Outcome: Adequate for Care Transition  Flowsheets (Taken 4/21/2024 2225)  Outcome Evaluation: Patient adequate for discharge.  Plan of Care Reviewed With:   patient   spouse  Overall Patient Progress: improving  Goal: Patient-Specific Goal (Individualized)  Description: You can add care plan individualizations to a care plan. Examples of Individualization might be:  \"Parent requests to be called daily at 9am for status\", \"I have a hard time hearing out of my right ear\", or \"Do not touch me to wake me up as it startles  me\".  Outcome: Adequate for Care Transition  Goal: Absence of Hospital-Acquired Illness or Injury  Outcome: Adequate for Care Transition  Goal: Optimal Comfort and Wellbeing  Outcome: Adequate for Care Transition  Goal: Readiness for Transition of Care  Outcome: Adequate for Care Transition     Problem: Fall Injury Risk  Goal: Absence of Fall and Fall-Related Injury  Outcome: Adequate for Care Transition   Data: Nela Bello transferred to 425 via wheelchair at 2145. Baby transferred via parent's arms.  Action: Receiving unit notified of transfer: Yes. Patient and family notified of room change. Report given to Staff RN at 2145. Belongings sent to receiving unit. Accompanied by Registered Nurse. Oriented patient to surroundings. Call light within reach. ID bands double-checked with receiving RN.  Response: Patient tolerated transfer and is stable.  "

## 2024-04-22 NOTE — ANESTHESIA POSTPROCEDURE EVALUATION
Patient: Nela Bello    Procedure:        Anesthesia Type:  No value filed.    Note:     Postop Pain Control:    PONV:    Neuro/Psych:    Airway/Respiratory:    CV/Hemodynamics:    Other NRE:    DID A NON-ROUTINE EVENT OCCUR?     Event details/Postop Comments:      S/P epidural for labor.   I or my partner was immediately available for management of this patient during epidural analgesia infusion.  VSS.  Doing well. Block resolved.  Neuro at baseline. Denies positional headache. Minimal side effects easily managed w/ PRN meds. No apparent anesthetic complications. No follow-up required.    Jennyfer Andrade MD             Last vitals:  Vitals:    04/21/24 2202 04/22/24 0257 04/22/24 0836   BP: (!) 143/93  107/72   Pulse: 88  118   Resp: 16 16 18   Temp: 97.9  F (36.6  C) 97.9  F (36.6  C) 97.7  F (36.5  C)   SpO2:          Electronically Signed By: Jennyfer Andrade MD  April 22, 2024  2:05 PM

## 2024-04-22 NOTE — PLAN OF CARE
"  Problem: Adult Inpatient Plan of Care  Goal: Plan of Care Review  Description: The Plan of Care Review/Shift note should be completed every shift.  The Outcome Evaluation is a brief statement about your assessment that the patient is improving, declining, or no change.  This information will be displayed automatically on your shift  note.  Outcome: Progressing  Flowsheets (Taken 4/21/2024 1946)  Outcome Evaluation: Patient progressed well in labor, recieved epidural for pain and was greatful for that. Delivered healthy baby boy at 6:58. Mom & Baby are vitally stable and recovering. Handoff given to Ifrah ARENAS.  Plan of Care Reviewed With:   patient   spouse  Overall Patient Progress: improving  Goal: Patient-Specific Goal (Individualized)  Description: You can add care plan individualizations to a care plan. Examples of Individualization might be:  \"Parent requests to be called daily at 9am for status\", \"I have a hard time hearing out of my right ear\", or \"Do not touch me to wake me up as it startles  me\".  Outcome: Progressing  Goal: Absence of Hospital-Acquired Illness or Injury  Outcome: Progressing  Goal: Optimal Comfort and Wellbeing  Outcome: Progressing  Intervention: Monitor Pain and Promote Comfort  Recent Flowsheet Documentation  Taken 4/21/2024 1654 by Corrina Garvin, RN  Pain Management Interventions: medication (see MAR)  Taken 4/21/2024 1500 by Corrina Garvin, RN  Pain Management Interventions: medication (see MAR)  Goal: Readiness for Transition of Care  Outcome: Progressing     Problem: Postpartum (Vaginal Delivery)  Goal: Successful Parent Role Transition  Outcome: Progressing  Goal: Hemostasis  Outcome: Progressing  Goal: Absence of Infection Signs and Symptoms  Outcome: Progressing  Goal: Anesthesia/Sedation Recovery  Outcome: Progressing  Goal: Optimal Pain Control and Function  Outcome: Progressing  Intervention: Prevent or Manage Pain  Recent Flowsheet Documentation  Taken 4/21/2024 " 1654 by Corrina Garvin RN  Pain Management Interventions: medication (see MAR)  Taken 4/21/2024 1500 by Corrina Garvin RN  Pain Management Interventions: medication (see MAR)  Goal: Effective Urinary Elimination  Outcome: Progressing   Goal Outcome Evaluation:      Plan of Care Reviewed With: patient, spouse    Overall Patient Progress: improvingOverall Patient Progress: improving    Outcome Evaluation: Patient progressed well in labor, recieved epidural for pain and was greatful for that. Delivered healthy baby boy at 6:58. Mom & Baby are vitally stable and recovering. Handoff given to Ifrah ARENAS.

## 2024-04-22 NOTE — PLAN OF CARE
Goal Outcome Evaluation:     VSS- besides mildly elevated blood pressures. Up independently. Eating & drinking normally. Moderate perineal pain controlled with tylenol & ibuprofen. Bonding well with baby vamsi- Enzo.  supportive @ bedside. Breastfeeding baby with some assistance about every 3 hours with a nipple shield. Fundus firm @ midline. Denies preeclampsia symptoms. Voiding adequately.    Problem: Adult Inpatient Plan of Care  Goal: Absence of Hospital-Acquired Illness or Injury  Intervention: Prevent Skin Injury  Recent Flowsheet Documentation  Taken 4/22/2024 0300 by Melody Hylton RN  Body Position: position changed independently  Goal: Optimal Comfort and Wellbeing  Intervention: Monitor Pain and Promote Comfort  Recent Flowsheet Documentation  Taken 4/22/2024 0300 by Melody Hylton RN  Pain Management Interventions:   medication (see MAR)   cold applied  Intervention: Provide Person-Centered Care  Recent Flowsheet Documentation  Taken 4/22/2024 0300 by Melody Hylton RN  Trust Relationship/Rapport:   care explained   choices provided     Problem: Labor  Goal: Stable Fetal Wellbeing  Intervention: Promote and Monitor Fetal Wellbeing  Recent Flowsheet Documentation  Taken 4/22/2024 0300 by Melody Hylton RN  Body Position: position changed independently     Problem: Postpartum (Vaginal Delivery)  Goal: Optimal Pain Control and Function  Intervention: Prevent or Manage Pain  Recent Flowsheet Documentation  Taken 4/22/2024 0300 by Melody Hylton RN  Pain Management Interventions:   medication (see MAR)   cold applied

## 2024-04-22 NOTE — LACTATION NOTE
Lactation in to see patient. Reviewed breastfeeding information. Encouraged to  Feed every 2-3 hours or sooner if baby cueing. Using a shield for smooth nipples. Educated on supply and demand, when milk transitions in, how to wean off of shield.     Family called writer in to see a feed. Baby at breast in cross cradle. Assisted with proper hand positioning. Encouraged compressions to keep baby swallowing and interested at breast. Cleaning and care of shield reviewed, many questions answered. Knows to call for assistance or questions.
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
## 2024-04-22 NOTE — PROVIDER NOTIFICATION
04/21/24 2021   Provider Notification   Provider Name/Title Dr. Vickers   Method of Notification Electronic Page   Request Evaluate - Remote   Notification Reason Status Update     Pt had one severe range pressure 164/84 and mild range after at 141/84.    TORB to keep hourly Bps until BP is normal and to add the hypertension order set with labetalol. Per MD, favor oxycodone or fentanyl rather than toradol since patient has elevated BP and pt had some bleeding after delivery.

## 2024-04-22 NOTE — PROVIDER NOTIFICATION
RN bedside to place davis catheter  Patient resting comfortably  Davis placed with urine return  SVE after davis revealed 10/100/+1  RN requested additional RN to double check exam.   Earlene RN came to bedside and confirmed SVE.  Vocera page sent to MD to update on SVE. MD stated she would head to hospital.
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
## 2024-04-22 NOTE — PROVIDER NOTIFICATION
04/21/24 2223   Provider Notification   Provider Name/Title Dr. Vickers   Method of Notification Electronic Page   Request Evaluate-Remote   Notification Reason Other     Provider paged to clarify vital sign frequency considering pt has had some mild range Bps. Provider is okay with checking blood pressures Q4 overnight & writer is to notify provider if blood pressures increase to 150s/100s.

## 2024-04-22 NOTE — PLAN OF CARE
Patients mobililty level scored using the bedside mobility assistance tool (BMAT). Patient is at a mobility level test number: 3. Mobility equipment used: wheelchair. Required assist of 1 staff members. Further use of BMAT scoring required.

## 2024-04-23 VITALS
WEIGHT: 189.2 LBS | SYSTOLIC BLOOD PRESSURE: 118 MMHG | HEIGHT: 65 IN | DIASTOLIC BLOOD PRESSURE: 79 MMHG | HEART RATE: 71 BPM | BODY MASS INDEX: 31.52 KG/M2 | TEMPERATURE: 97.9 F | RESPIRATION RATE: 18 BRPM | OXYGEN SATURATION: 97 %

## 2024-04-23 PROCEDURE — 250N000013 HC RX MED GY IP 250 OP 250 PS 637: Performed by: OBSTETRICS & GYNECOLOGY

## 2024-04-23 RX ORDER — MODIFIED LANOLIN
OINTMENT (GRAM) TOPICAL
Qty: 7 G | Refills: 3 | Status: SHIPPED | OUTPATIENT
Start: 2024-04-23

## 2024-04-23 RX ORDER — BUPIVACAINE HYDROCHLORIDE 2.5 MG/ML
INJECTION, SOLUTION EPIDURAL; INFILTRATION; INTRACAUDAL
Status: COMPLETED | OUTPATIENT
Start: 2024-04-21 | End: 2024-04-21

## 2024-04-23 RX ORDER — HYDROCORTISONE 25 MG/G
CREAM TOPICAL 3 TIMES DAILY PRN
Qty: 30 G | Refills: 0 | Status: SHIPPED | OUTPATIENT
Start: 2024-04-23

## 2024-04-23 RX ORDER — ACETAMINOPHEN 325 MG/1
650 TABLET ORAL EVERY 4 HOURS PRN
Qty: 40 TABLET | Refills: 1 | Status: SHIPPED | OUTPATIENT
Start: 2024-04-23

## 2024-04-23 RX ORDER — IBUPROFEN 800 MG/1
800 TABLET, FILM COATED ORAL EVERY 6 HOURS PRN
Qty: 40 TABLET | Refills: 1 | Status: SHIPPED | OUTPATIENT
Start: 2024-04-23

## 2024-04-23 RX ORDER — DOCUSATE SODIUM 100 MG/1
100 CAPSULE, LIQUID FILLED ORAL DAILY
Qty: 30 CAPSULE | Refills: 0 | Status: SHIPPED | OUTPATIENT
Start: 2024-04-24

## 2024-04-23 RX ADMIN — IBUPROFEN 800 MG: 800 TABLET, FILM COATED ORAL at 03:07

## 2024-04-23 RX ADMIN — ACETAMINOPHEN 650 MG: 325 TABLET, FILM COATED ORAL at 00:24

## 2024-04-23 RX ADMIN — ACETAMINOPHEN 650 MG: 325 TABLET, FILM COATED ORAL at 08:19

## 2024-04-23 RX ADMIN — IBUPROFEN 800 MG: 800 TABLET, FILM COATED ORAL at 12:24

## 2024-04-23 RX ADMIN — ACETAMINOPHEN 650 MG: 325 TABLET, FILM COATED ORAL at 12:23

## 2024-04-23 RX ADMIN — DOCUSATE SODIUM 100 MG: 100 CAPSULE, LIQUID FILLED ORAL at 08:19

## 2024-04-23 ASSESSMENT — ACTIVITIES OF DAILY LIVING (ADL)
ADLS_ACUITY_SCORE: 18
DEPENDENT_IADLS:: INDEPENDENT
ADLS_ACUITY_SCORE: 18

## 2024-04-23 NOTE — LACTATION NOTE
Lactation visit: Nela has been breast feeding infant. She is very sore and tender gel pads and mother's love cream given to her for comfort. Infant has been peeing and pooping and doing well. Discussed latch and pumping and soreness. Lactation sheet given to parents and questions answered. Discharging home today.

## 2024-04-23 NOTE — ANESTHESIA PROCEDURE NOTES
Dural puncture epidural Procedure Note    Pre-Procedure   Staff -        Anesthesiologist:  Maira Carlos MD       Performed By: anesthesiologist       Location: OB       Procedure Start/Stop Times: 4/21/2024 2:57 PM and 4/21/2024 3:07 PM       Pre-Anesthestic Checklist: patient identified, IV checked, risks and benefits discussed, informed consent, monitors and equipment checked, pre-op evaluation and at physician/surgeon's request  Timeout:       Correct Patient: Yes        Correct Procedure: Yes        Correct Site: Yes        Correct Position: Yes   Procedure Documentation  Procedure: dural puncture epidural       Patient Position: sitting       Skin prep: Chloraprep       Local skin infiltrated with 3 mL of 2% lidocaine.        Insertion Site: L3-4. (midline approach).       Technique: LORT air        GLEN at 5 cm.       Needle Type: Touhy       Needle Gauge: 17.        Needle Length (Inches): 3.5        Spinal Needle Type: Pencan       Spinal Needle (gauge): 25        Spinal Needle Length (inches): 4.69       Catheter: 19 G.          Catheter threaded easily.         5 cm epidural space.         Threaded 10 cm at skin.         # of attempts: 1 and  # of redirects:  1    Assessment/Narrative         Paresthesias: No.       Test dose of 3 mL lidocaine 1.5% w/ 1:200,000 epinephrine at.         Test dose negative, 3 minutes after injection, for signs of intravascular, subdural, or intrathecal injection.       Insertion/Infusion Method: LORT air       Aspiration negative for Heme or CSF via Epidural Catheter.       Sensory Level Left: T10.       Sensory Level Right: T10.       CSF fluid: with Spinal needle.CSF fluid removed: with Epidural needle - not with Epidural needle.    Medication(s) Administered   0.25% Bupivacaine PF (Intrathecal) - EPIDURAL   10 mL - 4/21/2024 2:57:00 PM  Medication Administration Time: 4/21/2024 2:57 PM      FOR [facility] [place] ONLY:   Pain Team Contact information: please page the  "Pain Team Via [facility]. Search \"Pain\". During daytime hours, please page the attending first. At night please page the resident first.      "

## 2024-04-23 NOTE — CONSULTS
INITIAL SOCIAL WORK MATERNITY ASSESSMENT     DATA:      Reason for Social Work Consult: mental health history and concern about postpartum time.     Presenting Information: Sw met with family at bedside to complete assessment    Living Situation: Live in apartment with spouse and also custody of 10yo stepchild 50% of the time.     Social Support/Professional Community Support:  good family/friend support network.    Insurance: no questions/concerns.     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 Spouse works in construction management and will take 2 weeks off.    Baby Supplies: have all supplies     Mental Health History: history of DOMENICO/MDD. Manage with therapy (not currently seeing someone but would like to). Tried meds in past without success but open to trying again.     History of Postpartum Mood Disorders: NA first pregnancy.     Chemical Health History: NA        INTERVENTION:      SW completed chart review and collaborated with the multidisciplinary team.   Psychosocial Assessment   Introduction to Maternal Child Health  role and scope of practice   Reviewed Hospital and Community Resources   Assessed Chemical Health History and Current Symptoms   Assessed Mental Health History and Current Symptoms   Identified stressors, barriers and family concerns   Provided support and active empathetic listening and validation.   Provided psychoeducation on  mood and anxiety disorders, assessed for any current symptoms or history    ASSESSMENT:      Sw met with family to complete assessment. Pt has been followed by [facility] and has had considerable concern regarding postpartum period. SW discussed baby blues vs. PPMD and offered resources. Pt endorses positive feelings towards both birth and pregnancy. She has some concern about lactation after discharge.      PLAN:       Sw remains available to pt/family. Sw will communicate lactation  concerns to charge RN.    Social work will continue to follow and assist with discharge planning as needed.    SHEELA Lucas, [facility]  Inpatient Care Coordination  [facility]  [phone]

## 2024-04-23 NOTE — DISCHARGE INSTRUCTIONS
"Postpartum Care at Home With Your Baby: Care Instructions  Overview     After childbirth (postpartum period), your body goes through many changes as you recover. In these weeks after delivery, try to take good care of yourself. Get rest whenever you can and accept help from others.  It may take 4 to 6 weeks to feel like yourself again, and possibly longer if you had a  birth. You may feel sore or very tired as you recover. After delivery, you may continue to have contractions as the uterus returns to the size it was before your pregnancy. You will also have some vaginal bleeding. And you may have pain around the vagina as you heal. Several days after delivery you may also have pain and swelling in your breasts as they fill with milk. There are things you can do at home to help ease these discomforts.  After childbirth, it's common to feel emotional. You may feel irritable, cry easily, and feel happy one minute and sad the next. This is called the \"baby blues.\" Hormone changes are one cause of these emotional changes. These feelings usually get better within a couple of weeks. If they don't, talk to your doctor or midwife.  In the first couple of weeks after you give birth, your doctor or midwife may want to check in with you and make a plan for follow-up care. You will likely have a complete postpartum visit in the first 3 months after delivery. At that time, your doctor or midwife will check on your recovery and see how you're doing. But if you have questions or concerns before then, you can always call your doctor or midwife.  Follow-up care is a key part of your treatment and safety. Be sure to make and go to all appointments, and call your doctor if you are having problems. It's also a good idea to know your test results and keep a list of the medicines you take.  How can you care for yourself at home?  Taking care of your body  Use pads instead of tampons for bleeding. After birth, you will have bloody " vaginal discharge. You may also pass some blood clots that shouldn't be bigger than an egg. Over the next 6 weeks or so, your bleeding should decrease a little every day and slowly change to a pinkish and then whitish discharge.  For cramps or mild pain, try an over-the-counter pain medicine, such as acetaminophen (Tylenol) or ibuprofen (Advil, Motrin). Read and follow all instructions on the label.  To ease pain around the vagina or from hemorrhoids:  Put ice or a cold pack on the area for 10 to 20 minutes at a time. Put a thin cloth between the ice and your skin.  Try sitting in a few inches of warm water (sitz bath) when you can or after bowel movements.  Clean yourself with a gentle squeeze of warm water from a bottle instead of wiping with toilet paper.  Use witch hazel or hemorrhoid pads (such as Tucks).  Try using a cold compress for sore and swollen breasts. And wear a supportive bra that fits.  Ease constipation by drinking plenty of fluids and eating high-fiber foods. Ask your doctor or midwife about over-the-counter stool softeners.  Activity  Rest when you can.  Ask for help from family or friends when you need it.  If you can, have another adult in your home for at least 2 or 3 days after birth.  When you feel ready, try to get some exercise every day. For many people, walking is a good choice. Don't do any heavy exercise until your doctor or midwife says it's okay.  Ask your doctor or midwife when it is okay to have vaginal sex.  If you don't want to get pregnant, talk to your doctor or midwife about birth control options. You can get pregnant even before your period returns. Also, you can get pregnant while you are breastfeeding.  Talk to your doctor or midwife if you want to get pregnant again. They can talk to you about when it is safe.  Emotional health  It's normal to have some sadness, anxiety, and mood swings after delivery. It may help to talk with a trusted friend or family member. You can  also call the Maternal Mental Health Hotline at 1-674-ACP-MAMA (1-397.803.7095) for support. If these mood changes last more than a couple of weeks, talk to your doctor or midwife.  When should you call for help?  Share this information with your partner, family, or a friend. They can help you watch for warning signs.  Call 911  anytime you think you may need emergency care. For example, call if:    You feel you cannot stop from hurting yourself, your baby, or someone else.     You passed out (lost consciousness).     You have chest pain, are short of breath, or cough up blood.     You have a seizure.   Where to get help 24 hours a day, 7 days a week   If you or someone you know talks about suicide, self-harm, a mental health crisis, a substance use crisis, or any other kind of emotional distress, get help right away. You can:    Call the Suicide and Crisis Lifeline at 988.     Call 6-051-797-TALK (1-502.910.8110).     Text HOME to 014277 to access the Crisis Text Line.   Consider saving these numbers in your phone.  Go to View[employer] for more information or to chat online.  Call your doctor or midwife now or seek immediate medical care if:    You have signs of hemorrhage (too much bleeding), such as:  Heavy vaginal bleeding. This means that you are soaking through one or more pads in an hour. Or you pass blood clots bigger than an egg.  Feeling dizzy or lightheaded, or you feel like you may faint.  Feeling so tired or weak that you cannot do your usual activities.  A fast or irregular heartbeat.  New or worse belly pain.     You have signs of infection, such as:  A fever.  Increased pain, swelling, warmth, or redness from an incision or wound.  Frequent or painful urination or blood in your urine.  Vaginal discharge that smells bad.  New or worse belly pain.     You have symptoms of a blood clot in your leg (called a deep vein thrombosis), such as:  Pain in the calf, back of the knee, thigh, or groin.  Swelling  "in the leg or groin.  A color change on the leg or groin. The skin may be reddish or purplish, depending on your usual skin color.     You have signs of preeclampsia, such as:  Sudden swelling of your face, hands, or feet.  New vision problems (such as dimness, blurring, or seeing spots).  A severe headache.     You have signs of heart failure, such as:  New or increased shortness of breath.  New or worse swelling in your legs, ankles, or feet.  Sudden weight gain, such as more than 2 to 3 pounds in a day or 5 pounds in a week.  Feeling so tired or weak that you cannot do your usual activities.     You had spinal or epidural pain relief and have:  New or worse back pain.  Increased pain, swelling, warmth, or redness at the injection site.  Tingling, weakness, or numbness in your legs or groin.   Watch closely for changes in your health, and be sure to contact your doctor or midwife if:    Your vaginal bleeding isn't decreasing.     You feel sad, anxious, or hopeless for more than a few days.     You are having problems with your breasts or breastfeeding.   Where can you learn more?  Go to https://www.[employer].net/patiented  Enter Z768 in the search box to learn more about \"Postpartum Care at Home With Your Baby: Care Instructions.\"  Current as of: July 10, 2023               Content Version: 14.0    7004-5476 [employer].   Care instructions adapted under license by your healthcare professional. If you have questions about a medical condition or this instruction, always ask your healthcare professional. [employer] disclaims any warranty or liability for your use of this information.    1) Call for temperature greater than 100.4F, foul smelling vaginal discharge, bleeding more than 1 pad per hour for 2 hrs, pain not controlled by oral pain meds, severe constipation or severe nausea or vomiting.  2) She was instructed to follow-up with her primary OB in 6 weeks for a routine postpartum " visit  3) She was instructed to continue her PNV on discharge if she wished to breast feed her infant.   Per Dr. oHbbs OB/GYN

## 2024-04-23 NOTE — PLAN OF CARE
Vital signs stable. Postpartum checks WDL. Pt is up ad coty, voiding w/o difficulties. Pt is independent in self cares. Pt is breast feeding with a nipple shield every 2-3 hrs, tolerating well. Pain well controlled with Tylenol and Ibuprofen. Pt stated increased comfort after each medication. Pt is attentive to all  cues and cares. Positive bonding observed. FOB at bedside, supportive of pt.        Problem: Adult Inpatient Plan of Care  Goal: Absence of Hospital-Acquired Illness or Injury  Intervention: Prevent Skin Injury  Recent Flowsheet Documentation  Taken 2024 001 by Ene Win RN  Body Position: position changed independently  Taken 2024 by Ene Win RN  Body Position: position changed independently  Intervention: Prevent Infection  Recent Flowsheet Documentation  Taken 2024 by Ene Win RN  Infection Prevention:   hand hygiene promoted   rest/sleep promoted   single patient room provided  Taken 2024 by Ene Win RN  Infection Prevention:   hand hygiene promoted   rest/sleep promoted   single patient room provided  Goal: Optimal Comfort and Wellbeing  Intervention: Monitor Pain and Promote Comfort  Recent Flowsheet Documentation  Taken 2024 by Ene Win RN  Pain Management Interventions: medication (see MAR)  Intervention: Provide Person-Centered Care  Recent Flowsheet Documentation  Taken 2024 by Ene Win RN  Trust Relationship/Rapport:   care explained   choices provided   emotional support provided   empathic listening provided   questions answered   questions encouraged   reassurance provided   thoughts/feelings acknowledged  Taken 2024 by Ene Win RN  Trust Relationship/Rapport:   care explained   choices provided   emotional support provided   empathic listening provided   questions answered   questions encouraged   reassurance provided   thoughts/feelings acknowledged     Problem:  Labor  Goal: Stable Fetal Wellbeing  Intervention: Promote and Monitor Fetal Wellbeing  Recent Flowsheet Documentation  Taken 4/23/2024 0018 by Ene Win RN  Body Position: position changed independently  Taken 4/22/2024 2013 by Ene Win RN  Body Position: position changed independently  Goal: Absence of Infection Signs and Symptoms  Intervention: Prevent or Manage Infection  Recent Flowsheet Documentation  Taken 4/23/2024 0018 by Ene Win RN  Infection Prevention:   hand hygiene promoted   rest/sleep promoted   single patient room provided  Taken 4/22/2024 2013 by Ene Win RN  Infection Prevention:   hand hygiene promoted   rest/sleep promoted   single patient room provided     Problem: Postpartum (Vaginal Delivery)  Goal: Optimal Pain Control and Function  Intervention: Prevent or Manage Pain  Recent Flowsheet Documentation  Taken 4/23/2024 0018 by Ene Win RN  Perineal Care:   perineal hygiene encouraged   perineal spray bottle/warm water use encouraged  Taken 4/22/2024 2013 by Ene Win RN  Pain Management Interventions: medication (see MAR)  Perineal Care:   perineal hygiene encouraged   perineal spray bottle/warm water use encouraged     Problem: Pain Acute  Goal: Optimal Pain Control and Function  Intervention: Develop Pain Management Plan  Recent Flowsheet Documentation  Taken 4/22/2024 2013 by Ene Win RN  Pain Management Interventions: medication (see MAR)

## 2024-04-23 NOTE — PROGRESS NOTES
Data: Vital signs within normal limits. Postpartum checks within normal limits - see flow record. Patient eating and drinking normally. Patient able to empty bladder independently and is up ambulating. No apparent signs of infection. Second degree laceration perineal area healing well. Patient performing self cares and is able to care for infant.  Action: Patient medicated during the shift for pain and cramping. See MAR. Patient reassessed within 1 hour after each medication and pain was improved - patient stated she was comfortable. Patient education done about discharge instructions, medication schedules . See flow record.  Response: Positive attachment behaviors observed with infant.  at bedside.   Plan: Anticipate discharge on 4/23/24      Patient's After Visit Summary was reviewed with patient and/or spouse.   Patient verbalized understanding of After Visit Summary, recommended follow up and was given an opportunity to ask questions.   Discharge medications sent home with patient/family: Not applicable   Discharged with spouse and son

## 2024-04-23 NOTE — PROGRESS NOTES
"[facility] Postpartum Note    S:  Nela Bello feels well this morning. Was not able to sleep last night. Pain control adequate. Lochia minimal. Voiding. Breastfeeding. Mood Good.     O:  Vitals were reviewed  Blood pressure 118/79, pulse 71, temperature 97.9  F (36.6  C), temperature source Oral, resp. rate 18, height 1.651 m (5' 5\"), weight 86.2 kg (190 lb), SpO2 97%, unknown if currently breastfeeding.    General: healthy, alert, and no distress  Abd: soft, appropriately tender, fundus firm  Legs: Non-tender, 0+ pitting edema    No results found for: \"RH\"  Rubella: immune    Assessment and Plan:   Postpartum Day #2, status post vaginal delivery, doing well.  -- Discharge home  -- F/U 1 weeks w/ Primary OB  -- Discharge meds: see med rec  -- Contraception: considering vasectomy    Plan:   - Postpartum: recovering well. Pain well controlled. Cont PO pain meds and regular diet. Encourage ambulation.  - Anemia, mild, astx          + Hgb 12.1 > 10.8  - Elevated BP w/o Dx of HTN          + No s/stx, BP improving w/o medication          + Continue checking BP at home          + HELLP labs normal  - Hx abuse, DOMENICO, MDD          + SW consulted  - Contraception: Vasectomy  - Dispo: anticipate DC PPD#2-3 depending on BP control     Sissy Hobbs MD    "

## 2024-05-08 NOTE — PLAN OF CARE
Data: Patient assessed in the [facility]place for decreased fetal movement.  Cervical exam not examined.  Membranes intact.  Contractions/uterine assessment occasional lower abdominal tightening.  Action:  Presumed adequate fetal oxygenation documented (see flow record). Discharge instructions reviewed.  Patient instructed to report change in fetal movement, vaginal leaking of fluid or bleeding, abdominal pain, or any concerns related to the pregnancy to her nurse/physician.    Response: Orders to discharge home per .  Patient verbalized understanding of education and verbalized agreement with plan. Discharged to home at 1121.      Render Path Notes In Note?: Yes

## 2024-05-26 ENCOUNTER — HEALTH MAINTENANCE LETTER (OUTPATIENT)
Age: 34
End: 2024-05-26

## 2025-06-14 ENCOUNTER — HEALTH MAINTENANCE LETTER (OUTPATIENT)
Age: 35
End: 2025-06-14